# Patient Record
(demographics unavailable — no encounter records)

---

## 2019-09-06 NOTE — ULT
ULTRASOUND ABDOMEN COMPLETE:



DATE:

9/6/2019



HISTORY:

18-year-old female with generalized abdominal pain



FINDINGS:

Gallbladder: Normal wall thickness. No gallstones or sludge identified. No pericholecystic fluid.

Liver: Normal parenchymal echogenicity.

Bilateral kidneys: No hydronephrosis.

Pancreas: Nonspecific sonographic appearance.

Common duct caliber: 3 mm.

Abdominal aorta: No aneurysm

Inferior vena cava: Unremarkable where visualized.

Spleen: No splenomegaly



IMPRESSION:

Normal.



Reported By: Néstor Hansen 

Electronically Signed:  9/6/2019 11:34 AM